# Patient Record
Sex: FEMALE | Race: WHITE | NOT HISPANIC OR LATINO | URBAN - METROPOLITAN AREA
[De-identification: names, ages, dates, MRNs, and addresses within clinical notes are randomized per-mention and may not be internally consistent; named-entity substitution may affect disease eponyms.]

---

## 2018-01-22 ENCOUNTER — FOLLOW UP ESTABLISHED (OUTPATIENT)
Dept: URBAN - METROPOLITAN AREA CLINIC 10 | Facility: CLINIC | Age: 58
End: 2018-01-22

## 2018-01-22 DIAGNOSIS — H04.123 TEAR FILM INSUFFICIENCY OF BILATERAL LACRIMAL GLANDS: ICD-10-CM

## 2018-01-22 PROCEDURE — 92014 COMPRE OPH EXAM EST PT 1/>: CPT | Performed by: OPTOMETRIST

## 2018-01-22 ASSESSMENT — INTRAOCULAR PRESSURE
OD: 12
OS: 12

## 2018-01-22 ASSESSMENT — KERATOMETRY
OD: 43.00
OS: 43.75

## 2018-01-22 ASSESSMENT — VISUAL ACUITY
OS: 20/25
OD: 20/25

## 2020-07-27 ENCOUNTER — FOLLOW UP ESTABLISHED (OUTPATIENT)
Dept: URBAN - METROPOLITAN AREA CLINIC 10 | Facility: CLINIC | Age: 60
End: 2020-07-27
Payer: COMMERCIAL

## 2020-07-27 DIAGNOSIS — Z96.1 PRESENCE OF INTRAOCULAR LENS: ICD-10-CM

## 2020-07-27 DIAGNOSIS — G43.101 MIGRAINE WITH AURA, NOT INTRACTABLE, WITH STATUS MIGRAINOSUS: ICD-10-CM

## 2020-07-27 DIAGNOSIS — H43.811 VITREOUS DEGENERATION, RIGHT EYE: Primary | ICD-10-CM

## 2020-07-27 PROCEDURE — 92134 CPTRZ OPH DX IMG PST SGM RTA: CPT | Performed by: OPTOMETRIST

## 2020-07-27 PROCEDURE — 92014 COMPRE OPH EXAM EST PT 1/>: CPT | Performed by: OPTOMETRIST

## 2020-07-27 ASSESSMENT — VISUAL ACUITY
OD: 20/20
OS: 20/20

## 2020-07-27 ASSESSMENT — INTRAOCULAR PRESSURE
OD: 11
OS: 11

## 2021-05-11 ENCOUNTER — OFFICE VISIT (OUTPATIENT)
Dept: URBAN - METROPOLITAN AREA CLINIC 10 | Facility: CLINIC | Age: 61
End: 2021-05-11
Payer: COMMERCIAL

## 2021-05-11 PROCEDURE — 99214 OFFICE O/P EST MOD 30 MIN: CPT | Performed by: OPTOMETRIST

## 2021-05-11 PROCEDURE — 92134 CPTRZ OPH DX IMG PST SGM RTA: CPT | Performed by: OPTOMETRIST

## 2021-05-11 ASSESSMENT — VISUAL ACUITY
OD: 20/20
OS: 20/20

## 2021-05-11 ASSESSMENT — KERATOMETRY
OS: 43.75
OD: 43.00

## 2021-05-11 ASSESSMENT — INTRAOCULAR PRESSURE
OD: 13
OS: 15

## 2021-05-11 NOTE — IMPRESSION/PLAN
Impression: Tear film insufficiency of bilateral lacrimal glands Plan: MELISSA/MGD. Discussed lid hygiene, warm compresses, lipid based tears, and Omega 3 fish oils.

## 2021-05-11 NOTE — IMPRESSION/PLAN
Impression: Vitreous degeneration, right eye Plan: Discussed findings in detail. Discussed signs and symptoms of RD and RTC STAT if noticed. No retinal pathology.

## 2021-05-11 NOTE — IMPRESSION/PLAN
Impression: Migraine with aura, not intractable, with status migrainosus Plan: Pt. describes episodes of scintillating scotoma consistent c acephalgic migraines. Ophthalmic exam is normal.
Recommend f/u c PCP for evaluation and management of episodes.

## 2021-08-16 ENCOUNTER — APPOINTMENT (OUTPATIENT)
Age: 61
Setting detail: DERMATOLOGY
End: 2021-08-23

## 2021-08-16 DIAGNOSIS — L65.9 NONSCARRING HAIR LOSS, UNSPECIFIED: ICD-10-CM

## 2021-08-16 DIAGNOSIS — L67.8 OTHER HAIR COLOR AND HAIR SHAFT ABNORMALITIES: ICD-10-CM

## 2021-08-16 PROCEDURE — OTHER PRESCRIPTION MEDICATION MANAGEMENT: OTHER

## 2021-08-16 PROCEDURE — 99204 OFFICE O/P NEW MOD 45 MIN: CPT

## 2021-08-16 PROCEDURE — OTHER COUNSELING: OTHER

## 2021-08-16 PROCEDURE — OTHER MIPS QUALITY: OTHER

## 2021-08-16 ASSESSMENT — LOCATION SIMPLE DESCRIPTION DERM
LOCATION SIMPLE: ANTERIOR SCALP
LOCATION SIMPLE: POSTERIOR SCALP

## 2021-08-16 ASSESSMENT — LOCATION DETAILED DESCRIPTION DERM
LOCATION DETAILED: MID-FRONTAL SCALP
LOCATION DETAILED: POSTERIOR MID-PARIETAL SCALP

## 2021-08-16 ASSESSMENT — LOCATION ZONE DERM: LOCATION ZONE: SCALP

## 2021-08-16 NOTE — HPI: HAIR LOSS
Previous Labs: Yes
How Did The Hair Loss Occur?: gradual in onset
How Severe Is Your Hair Loss?: mild
What Hair Products Do You Use?: Olaplex shampoo and conditioner
Additional History: Patient feels she has increased shedding and her hair has been thinning. Does not eat often, friends have pointed this out as a concern of anorexia. Patient states prior to the hair loss she went through a divorce and also quit drinking alcohol and lost 20-30 pounds that she never gained back.\\n\\nPatient reports she takes a natural thyroid supplement, but has never established a formal diagnosis of thyroid disease, as well as topical, oral, and IM hormone replacement.
No

## 2021-08-16 NOTE — PROCEDURE: PRESCRIPTION MEDICATION MANAGEMENT
Plan: Outside labs reviewed do not indicate any nutritional or metabolic cause, but if patient remains concerned she should consult with an endocrinologist. Advised patient that any exogenous androgens she is taking for HRT may be contributing to hair loss.
Initiate Treatment: Nutrafol hair supplement\\nRogaine foam
Discontinue Regimen: Biotin
Render In Strict Bullet Format?: No
Detail Level: Zone